# Patient Record
Sex: FEMALE | Employment: PART TIME | URBAN - METROPOLITAN AREA
[De-identification: names, ages, dates, MRNs, and addresses within clinical notes are randomized per-mention and may not be internally consistent; named-entity substitution may affect disease eponyms.]

---

## 2023-01-13 ENCOUNTER — APPOINTMENT (OUTPATIENT)
Dept: CT IMAGING | Age: 23
End: 2023-01-13
Payer: COMMERCIAL

## 2023-01-13 ENCOUNTER — HOSPITAL ENCOUNTER (EMERGENCY)
Age: 23
Discharge: HOME | End: 2023-01-13
Payer: COMMERCIAL

## 2023-01-13 VITALS
WEIGHT: 192 LBS | TEMPERATURE: 98.9 F | SYSTOLIC BLOOD PRESSURE: 103 MMHG | DIASTOLIC BLOOD PRESSURE: 59 MMHG | HEART RATE: 87 BPM | BODY MASS INDEX: 28.44 KG/M2 | OXYGEN SATURATION: 95 % | RESPIRATION RATE: 14 BRPM | HEIGHT: 69 IN

## 2023-01-13 DIAGNOSIS — R19.7 DIARRHEA, UNSPECIFIED TYPE: ICD-10-CM

## 2023-01-13 DIAGNOSIS — R11.2 NAUSEA AND VOMITING, UNSPECIFIED VOMITING TYPE: ICD-10-CM

## 2023-01-13 DIAGNOSIS — R10.33 PERIUMBILICAL ABDOMINAL PAIN: ICD-10-CM

## 2023-01-13 DIAGNOSIS — K52.9 COLITIS: Primary | ICD-10-CM

## 2023-01-13 LAB
ALBUMIN SERPL-MCNC: 3.6 G/DL (ref 3.5–5.2)
ALP BLD-CCNC: 56 U/L (ref 35–104)
ALT SERPL-CCNC: 20 U/L (ref 0–32)
ANION GAP SERPL CALCULATED.3IONS-SCNC: 11 MMOL/L (ref 7–16)
AST SERPL-CCNC: 20 U/L (ref 0–31)
BACTERIA: ABNORMAL /HPF
BASOPHILS ABSOLUTE: 0.07 E9/L (ref 0–0.2)
BASOPHILS RELATIVE PERCENT: 0.5 % (ref 0–2)
BILIRUB SERPL-MCNC: 0.2 MG/DL (ref 0–1.2)
BILIRUBIN URINE: NEGATIVE
BLOOD, URINE: ABNORMAL
BUN BLDV-MCNC: 9 MG/DL (ref 6–20)
CALCIUM SERPL-MCNC: 8.8 MG/DL (ref 8.6–10.2)
CHLORIDE BLD-SCNC: 102 MMOL/L (ref 98–107)
CLARITY: CLEAR
CO2: 25 MMOL/L (ref 22–29)
COLOR: YELLOW
CREAT SERPL-MCNC: 0.7 MG/DL (ref 0.5–1)
EOSINOPHILS ABSOLUTE: 0.3 E9/L (ref 0.05–0.5)
EOSINOPHILS RELATIVE PERCENT: 2.3 % (ref 0–6)
EPITHELIAL CELLS, UA: ABNORMAL /HPF
GFR SERPL CREATININE-BSD FRML MDRD: >60 ML/MIN/1.73
GLUCOSE BLD-MCNC: 108 MG/DL (ref 74–99)
GLUCOSE URINE: NEGATIVE MG/DL
HCG, URINE, POC: NEGATIVE
HCT VFR BLD CALC: 32.8 % (ref 34–48)
HEMOGLOBIN: 10.9 G/DL (ref 11.5–15.5)
IMMATURE GRANULOCYTES #: 0.07 E9/L
IMMATURE GRANULOCYTES %: 0.5 % (ref 0–5)
KETONES, URINE: NEGATIVE MG/DL
LACTIC ACID: 1 MMOL/L (ref 0.5–2.2)
LEUKOCYTE ESTERASE, URINE: ABNORMAL
LIPASE: 18 U/L (ref 13–60)
LYMPHOCYTES ABSOLUTE: 2.54 E9/L (ref 1.5–4)
LYMPHOCYTES RELATIVE PERCENT: 19.7 % (ref 20–42)
Lab: NORMAL
MCH RBC QN AUTO: 28.6 PG (ref 26–35)
MCHC RBC AUTO-ENTMCNC: 33.2 % (ref 32–34.5)
MCV RBC AUTO: 86.1 FL (ref 80–99.9)
MONOCYTES ABSOLUTE: 1.22 E9/L (ref 0.1–0.95)
MONOCYTES RELATIVE PERCENT: 9.5 % (ref 2–12)
NEGATIVE QC PASS/FAIL: NORMAL
NEUTROPHILS ABSOLUTE: 8.67 E9/L (ref 1.8–7.3)
NEUTROPHILS RELATIVE PERCENT: 67.5 % (ref 43–80)
NITRITE, URINE: NEGATIVE
PDW BLD-RTO: 13.3 FL (ref 11.5–15)
PH UA: 7 (ref 5–9)
PLATELET # BLD: 322 E9/L (ref 130–450)
PMV BLD AUTO: 9.4 FL (ref 7–12)
POSITIVE QC PASS/FAIL: NORMAL
POTASSIUM SERPL-SCNC: 3.5 MMOL/L (ref 3.5–5)
PROTEIN UA: NEGATIVE MG/DL
RBC # BLD: 3.81 E12/L (ref 3.5–5.5)
RBC UA: ABNORMAL /HPF (ref 0–2)
SODIUM BLD-SCNC: 138 MMOL/L (ref 132–146)
SPECIFIC GRAVITY UA: 1.01 (ref 1–1.03)
TOTAL PROTEIN: 7.7 G/DL (ref 6.4–8.3)
UROBILINOGEN, URINE: 0.2 E.U./DL
WBC # BLD: 12.9 E9/L (ref 4.5–11.5)
WBC UA: ABNORMAL /HPF (ref 0–5)

## 2023-01-13 PROCEDURE — 6360000002 HC RX W HCPCS: Performed by: NURSE PRACTITIONER

## 2023-01-13 PROCEDURE — 96375 TX/PRO/DX INJ NEW DRUG ADDON: CPT

## 2023-01-13 PROCEDURE — 74177 CT ABD & PELVIS W/CONTRAST: CPT

## 2023-01-13 PROCEDURE — 2580000003 HC RX 258: Performed by: NURSE PRACTITIONER

## 2023-01-13 PROCEDURE — 80053 COMPREHEN METABOLIC PANEL: CPT

## 2023-01-13 PROCEDURE — 2580000003 HC RX 258: Performed by: RADIOLOGY

## 2023-01-13 PROCEDURE — 99285 EMERGENCY DEPT VISIT HI MDM: CPT

## 2023-01-13 PROCEDURE — 6360000004 HC RX CONTRAST MEDICATION: Performed by: RADIOLOGY

## 2023-01-13 PROCEDURE — 96374 THER/PROPH/DIAG INJ IV PUSH: CPT

## 2023-01-13 PROCEDURE — 81001 URINALYSIS AUTO W/SCOPE: CPT

## 2023-01-13 PROCEDURE — 85025 COMPLETE CBC W/AUTO DIFF WBC: CPT

## 2023-01-13 PROCEDURE — 83690 ASSAY OF LIPASE: CPT

## 2023-01-13 PROCEDURE — 83605 ASSAY OF LACTIC ACID: CPT

## 2023-01-13 RX ORDER — SODIUM CHLORIDE 0.9 % (FLUSH) 0.9 %
10 SYRINGE (ML) INJECTION PRN
Status: COMPLETED | OUTPATIENT
Start: 2023-01-13 | End: 2023-01-13

## 2023-01-13 RX ORDER — ONDANSETRON 2 MG/ML
4 INJECTION INTRAMUSCULAR; INTRAVENOUS ONCE
Status: COMPLETED | OUTPATIENT
Start: 2023-01-13 | End: 2023-01-13

## 2023-01-13 RX ORDER — CEFDINIR 300 MG/1
300 CAPSULE ORAL 2 TIMES DAILY
Qty: 20 CAPSULE | Refills: 0 | Status: SHIPPED | OUTPATIENT
Start: 2023-01-13 | End: 2023-01-23

## 2023-01-13 RX ORDER — 0.9 % SODIUM CHLORIDE 0.9 %
1000 INTRAVENOUS SOLUTION INTRAVENOUS ONCE
Status: COMPLETED | OUTPATIENT
Start: 2023-01-13 | End: 2023-01-13

## 2023-01-13 RX ORDER — KETOROLAC TROMETHAMINE 30 MG/ML
30 INJECTION, SOLUTION INTRAMUSCULAR; INTRAVENOUS ONCE
Status: COMPLETED | OUTPATIENT
Start: 2023-01-13 | End: 2023-01-13

## 2023-01-13 RX ORDER — METRONIDAZOLE 500 MG/1
500 TABLET ORAL 3 TIMES DAILY
Qty: 30 TABLET | Refills: 0 | Status: SHIPPED | OUTPATIENT
Start: 2023-01-13 | End: 2023-01-23

## 2023-01-13 RX ADMIN — KETOROLAC TROMETHAMINE 30 MG: 30 INJECTION, SOLUTION INTRAMUSCULAR; INTRAVENOUS at 22:15

## 2023-01-13 RX ADMIN — IOPAMIDOL 75 ML: 755 INJECTION, SOLUTION INTRAVENOUS at 22:04

## 2023-01-13 RX ADMIN — ONDANSETRON 4 MG: 2 INJECTION INTRAMUSCULAR; INTRAVENOUS at 22:14

## 2023-01-13 RX ADMIN — SODIUM CHLORIDE 1000 ML: 9 INJECTION, SOLUTION INTRAVENOUS at 22:00

## 2023-01-13 RX ADMIN — SODIUM CHLORIDE, PRESERVATIVE FREE 10 ML: 5 INJECTION INTRAVENOUS at 22:01

## 2023-01-13 ASSESSMENT — PAIN DESCRIPTION - LOCATION: LOCATION: ABDOMEN

## 2023-01-13 ASSESSMENT — PAIN SCALES - GENERAL: PAINLEVEL_OUTOF10: 2

## 2023-01-13 ASSESSMENT — PAIN DESCRIPTION - DESCRIPTORS: DESCRIPTORS: CRAMPING

## 2023-01-13 NOTE — Clinical Note
Cornelius Aguirre was seen and treated in our emergency department on 1/13/2023. She may return to work on 01/15/2023. If you have any questions or concerns, please don't hesitate to call.       Tonny Brady, GINO - CNP

## 2023-01-14 NOTE — ED PROVIDER NOTES
Independent [facility] Visit. [facility]  Department of Emergency Medicine   ED  Encounter Note  Admit Date/RoomTime: 2023  8:30 PM  ED Room: 33    NAME: Alysia Siddiqui  : 2000  MRN: [id number]     Chief Complaint:  Abdominal Pain (Umbilical pain/)    History of Present Illness       Alysia Siddiqui is a 26 y.o. old female who presents to the emergency department by private vehicle, for sudden onset aching, sharp pain in the periumbilical area without radiation which began 5 day(s) prior to arrival.   There has been no similar episodes in the past.  Since onset the symptoms have been remaining constant. The pain is associated with chills, nausea, and diarrhea. The pain is aggravated by nothing in particular and relieved by nothing. There has been NO chest pain, shortness of breath, fever, sweating, vomiting, anorexia, weight loss, constipation, dark/black stools, blood in stool, blood in emesis, cloudy urine, urinary frequency, dysuria, hematuria, urinary urgency, urinary incontinence, vaginal discharge, vaginal itching, or vaginal bleeding. Last Menstrual  Cycle or OB history not available or N/A. Birth Control: None. and STD Hx: Chlamydia which was several years ago when she had treated for and has not had any other STDs since. She has no concern for STDs. She has felt like she had a fever but has not had any documented fevers retracted. .  ROS   Pertinent positives and negatives are stated within HPI, all other systems reviewed and are negative. Past Medical History:  has a past medical history of Asthma. Surgical History has no past surgical history on file. Social History:  reports that she has never smoked. She does not have any smokeless tobacco history on file. She reports that she does not drink alcohol and does not use drugs. Family History: family history includes Diabetes in her father; High Cholesterol in her mother.      Allergies: Seasonal    Physical Exam   Oxygen Saturation Interpretation: Normal on room air analysis. ED Triage Vitals   BP Temp Temp src Pulse Resp SpO2 Height Weight   -- -- -- -- -- -- -- --        Physical Exam  General Appearance/Constitutional:  Alert, development consistent with age. HEENT:  NC/NT. PERRLA. Airway patent. Neck:  Supple. No lymphadenopathy. Respiratory:  No retractions. Lungs Clear to auscultation and breath sounds equal.  CV:  Regular rate and rhythm. GI:  normal appearing, non-distended with no visible hernias. Bowel sounds: normal bowel sounds. Tenderness: There is moderate tenderness present - located in the periumbilical area., There is no rebound tenderness. , There is no guarding. , There is no distension. , There is no pulsatile mass. .        Liver: non-tender. Spleen:  non-tender. Back: CVA Tenderness: No CVA tenderness. : /Pelvic examination deferred / declined. Integument:  Normal turgor. Warm, dry, without visible rash, unless noted elsewhere. Lymphatics: No edema, cap.refill <3sec. Neurological:  Orientation age-appropriate. Motor functions intact.     Lab / Imaging Results   (All laboratory and radiology results have been personally reviewed by myself)  Labs:  Results for orders placed or performed during the hospital encounter of 01/13/23   CBC with Auto Differential   Result Value Ref Range    WBC 12.9 (H) 4.5 - 11.5 E9/L    RBC 3.81 3.50 - 5.50 E12/L    Hemoglobin 10.9 (L) 11.5 - 15.5 g/dL    Hematocrit 32.8 (L) 34.0 - 48.0 %    MCV 86.1 80.0 - 99.9 fL    MCH 28.6 26.0 - 35.0 pg    MCHC 33.2 32.0 - 34.5 %    RDW 13.3 11.5 - 15.0 fL    Platelets 906 160 - 142 E9/L    MPV 9.4 7.0 - 12.0 fL    Neutrophils % 67.5 43.0 - 80.0 %    Immature Granulocytes % 0.5 0.0 - 5.0 %    Lymphocytes % 19.7 (L) 20.0 - 42.0 %    Monocytes % 9.5 2.0 - 12.0 %    Eosinophils % 2.3 0.0 - 6.0 %    Basophils % 0.5 0.0 - 2.0 %    Neutrophils Absolute 8.67 (H) 1.80 - 7.30 E9/L    Immature Granulocytes # 0.07 E9/L    Lymphocytes Absolute 2.54 1.50 - 4.00 E9/L    Monocytes Absolute 1.22 (H) 0.10 - 0.95 E9/L    Eosinophils Absolute 0.30 0.05 - 0.50 E9/L    Basophils Absolute 0.07 0.00 - 0.20 E9/L   CMP   Result Value Ref Range    Sodium 138 132 - 146 mmol/L    Potassium 3.5 3.5 - 5.0 mmol/L    Chloride 102 98 - 107 mmol/L    CO2 25 22 - 29 mmol/L    Anion Gap 11 7 - 16 mmol/L    Glucose 108 (H) 74 - 99 mg/dL    BUN 9 6 - 20 mg/dL    Creatinine 0.7 0.5 - 1.0 mg/dL    Est, Glom Filt Rate >60 >=60 mL/min/1.73    Calcium 8.8 8.6 - 10.2 mg/dL    Total Protein 7.7 6.4 - 8.3 g/dL    Albumin 3.6 3.5 - 5.2 g/dL    Total Bilirubin 0.2 0.0 - 1.2 mg/dL    Alkaline Phosphatase 56 35 - 104 U/L    ALT 20 0 - 32 U/L    AST 20 0 - 31 U/L   Lipase   Result Value Ref Range    Lipase 18 13 - 60 U/L   Lactic Acid   Result Value Ref Range    Lactic Acid 1.0 0.5 - 2.2 mmol/L   Urinalysis with Microscopic   Result Value Ref Range    Color, UA Yellow Straw/Yellow    Clarity, UA Clear Clear    Glucose, Ur Negative Negative mg/dL    Bilirubin Urine Negative Negative    Ketones, Urine Negative Negative mg/dL    Specific Gravity, UA 1.010 1.005 - 1.030    Blood, Urine TRACE-LYSED Negative    pH, UA 7.0 5.0 - 9.0    Protein, UA Negative Negative mg/dL    Urobilinogen, Urine 0.2 <2.0 E.U./dL    Nitrite, Urine Negative Negative    Leukocyte Esterase, Urine SMALL (A) Negative    WBC, UA 2-5 0 - 5 /HPF    RBC, UA 0-1 0 - 2 /HPF    Epithelial Cells, UA FEW /HPF    Bacteria, UA RARE (A) None Seen /HPF   POC Pregnancy Urine Qual   Result Value Ref Range    HCG, Urine, POC Negative Negative    Lot Number [id number]     Positive QC Pass/Fail Acceptable     Negative QC Pass/Fail Acceptable      Imaging: All Radiology results interpreted by Radiologist unless otherwise noted.   CT ABDOMEN PELVIS W IV CONTRAST Additional Contrast? None   Final Result   Wall thickening and inflammatory stranding of the rectosigmoid colon,   suggestive of proctocolitis. Clinical correlation recommended. No evidence of acute appendicitis. ED Course / Medical Decision Making     Medications   0.9 % sodium chloride bolus (1,000 mLs IntraVENous New Bag 1/13/23 2200)   ondansetron (ZOFRAN) injection 4 mg (4 mg IntraVENous Given 1/13/23 2214)   ketorolac (TORADOL) injection 30 mg (30 mg IntraVENous Given 1/13/23 2215)   iopamidol (ISOVUE-370) 76 % injection 75 mL (75 mLs IntraVENous Given 1/13/23 2204)   sodium chloride flush 0.9 % injection 10 mL (10 mLs IntraVENous Given 1/13/23 2201)        Re-Evaluations:  1/13/23      Time: 2316-reviewed all results with patient. Patient declines any anal sexual intercourse. She has no concerns for gonorrhea chlamydia. She is tolerating p.o. fluids. She feels safe for discharge. Patients condition is improving after treatment. Consultations:             None    Procedures:   none    Medical Decision Making    Patient presents to the ER for periumbilical abdominal pain that is been ongoing since Sunday along with nausea vomiting and diarrhea. She states she has had undocumented fevers due to not checking it. .   Social Determinants include   Social Connections: Not on file    Social Determinants : None. Chronic conditions    Past Medical History:   Diagnosis Date    Asthma    . Physical exam patient has periumbilical abdominal pain. Vital signs initially slightly tachycardic which is likely due to pain. .  Differential diagnoses include but not limited to diverticulitis versus gastroenteritis versus UTI. Diagnostic studies revealed urine pregnancy was negative. CBC reveals a slightly low white count at 12.9. CMP was unremarkable with. ACE and lactic acid were normal.  Urinalysis had rare bacteria and small leukocytes. She has no urine complaints.   CT of the abdomen pelvis with contrast reveals wall thickening and inflammatory stranding of the rectosigmoid colon suggestive of proctocolitis. Patient denies any sexual anal intercourse has no concern for gonorrhea or chlamydia. Will be treated for colitis/diverticulitis. consults included none in the emergency room and. Results were discussed with patient and her mother. Patient was given IV fluids, IV Toradol and IV Zofran for their symptoms with good improvement. Patient will be discharged home with the following prescriptions, p.o. Flagyl and p.o Omnicef. Discussed appropriate use and potential side effects of starting the prescribed medications. Patient continues to be non-toxic on re-evaluation. Findings were discussed with the patient and reasons to immediately return to the ED were articulated to them. They will follow-up with their PMD.      Discharge Instructions:   Patient referred to  Emeka Carrillo MD  [address]  [phone]    Call   to schedule an appt for follow up in 1-2 days    [address] [place] [address] Emergency Department  [address] [address]  [phone]  Go to   If symptoms worsen      MEDICATIONS:   DISCHARGE MEDICATIONS:  New Prescriptions    CEFDINIR (OMNICEF) 300 MG CAPSULE    Take 1 capsule by mouth 2 times daily for 10 days    METRONIDAZOLE (FLAGYL) 500 MG TABLET    Take 1 tablet by mouth 3 times daily for 10 days       DISCONTINUED MEDICATIONS:  Discontinued Medications    No medications on file       Record Review:  Records Reviewed : None       Disposition Considerations: This patient's ED course included: a personal history and physicial examination, re-evaluation prior to disposition, multiple bedside re-evaluations, and IV medications  This patient has remained hemodynamically stable and improved during their ED course. I emphasized the importance of follow-up with the physician I referred them to in the timeframe recommended.   I discussed with the patient emergent symptoms and the need to immediately return to the ER. Written information was included in their discharge instructions. Additional verbal discharge instructions were also given and discussed with the patient to supplement those generated by the EMR. We also discussed medications that were prescribed  (if any) including common side effects and interactions. The patient was advised to abstain from driving, operating heavy machinery or making significant decisions while taking medications such as opiates and muscle relaxers that may impair this. All questions were addressed. They understand return precautions and discharge instructions. The patient and mother  expressed understanding. Vitals were stable and they were in no distress at discharge. Plan of Care/Counseling:  GINO Miguel CNP reviewed today's visit with the patient and mother in addition to providing specific details for the plan of care and counseling regarding the diagnosis and prognosis. Questions are answered at this time and are agreeable with the plan. Assessment      1. Colitis    2. Periumbilical abdominal pain    3. Nausea and vomiting, unspecified vomiting type    4. Diarrhea, unspecified type      This patient's ED course included: a personal history and physicial examination, re-evaluation prior to disposition, multiple bedside re-evaluations, and IV medications  This patient has remained hemodynamically stable and improved during their ED course. Plan   Discharged home  Patient condition is stable. New Medications     New Prescriptions    CEFDINIR (OMNICEF) 300 MG CAPSULE    Take 1 capsule by mouth 2 times daily for 10 days    METRONIDAZOLE (FLAGYL) 500 MG TABLET    Take 1 tablet by mouth 3 times daily for 10 days     Electronically signed by GINO Miguel CNP   DD: 1/13/23  **This report was transcribed using voice recognition software. Every effort was made to ensure accuracy; however, inadvertent computerized transcription errors may be present.   END OF PROVIDER NOTE       Adam Archuleta, GINO - CNP  01/13/23 [id number]

## 2023-01-14 NOTE — ED NOTES
Received a phone call from the local pharmacy. Damaso Graves is on backorder. I reviewed the diagnosis and CAT scan findings and discussed antibiotic selection with the clinical pharmacist, Paulo Peña. Were to switch the patient to Augmentin 875 twice daily for 10 days and continue with the Flagyl. Follow-up as directed.   The pharmacist is aware and will dispense the change in medications as directed     Ramana Steen PA-C  01/14/23 [id number]